# Patient Record
Sex: FEMALE | Race: WHITE | URBAN - METROPOLITAN AREA
[De-identification: names, ages, dates, MRNs, and addresses within clinical notes are randomized per-mention and may not be internally consistent; named-entity substitution may affect disease eponyms.]

---

## 2017-01-20 LAB — TSH: 3.07 MIU/ML (ref 0.35–5.5)

## 2017-01-26 ENCOUNTER — TELEPHONE (OUTPATIENT)
Dept: FAMILY MEDICINE CLINIC | Facility: CLINIC | Age: 57
End: 2017-01-26

## 2017-01-26 NOTE — TELEPHONE ENCOUNTER
Calling for lab results. Wants copy of that result and results from sept. Please fax to [phone].  Also needs refill on inhaler sent to [facility]

## 2017-01-27 DIAGNOSIS — E03.9 HYPOTHYROIDISM, UNSPECIFIED TYPE: Primary | ICD-10-CM

## 2017-01-27 RX ORDER — FEXOFENADINE HCL AND PSEUDOEPHEDRINE HCI 180; 240 MG/1; MG/1
TABLET, EXTENDED RELEASE ORAL
COMMUNITY
Start: 2013-12-06 | End: 2017-07-18

## 2017-01-27 RX ORDER — ASPIRIN 81 MG/1
81 TABLET ORAL DAILY
COMMUNITY
Start: 2010-11-01

## 2017-01-27 RX ORDER — CHOLECALCIFEROL (VITAMIN D3) 125 MCG
1 CAPSULE ORAL DAILY
COMMUNITY
Start: 2010-11-01 | End: 2017-11-03

## 2017-01-27 RX ORDER — FEXOFENADINE HCL 180 MG/1
TABLET ORAL
COMMUNITY
Start: 2012-11-09 | End: 2017-07-18

## 2017-01-27 RX ORDER — ALBUTEROL SULFATE 90 UG/1
AEROSOL, METERED RESPIRATORY (INHALATION)
COMMUNITY
Start: 2011-09-30 | End: 2017-01-28

## 2017-01-27 RX ORDER — EPINEPHRINE 0.3 MG/.3ML
INJECTION SUBCUTANEOUS AS NEEDED
COMMUNITY
Start: 2015-09-18 | End: 2017-11-03

## 2017-01-27 RX ORDER — LEVOTHYROXINE AND LIOTHYRONINE 57; 13.5 UG/1; UG/1
TABLET ORAL
COMMUNITY
Start: 2011-09-30 | End: 2017-01-28

## 2017-01-27 RX ORDER — MONTELUKAST SODIUM 10 MG/1
10 TABLET ORAL DAILY
COMMUNITY
Start: 2015-09-18 | End: 2017-11-03

## 2017-01-27 NOTE — TELEPHONE ENCOUNTER
Patient calling for 1/20 Test Results. Phoned [facility]-Results forthcoming. Patient requesting copy of above and Previous test results mailed to Her home.

## 2017-01-27 NOTE — TELEPHONE ENCOUNTER
Phone Patient letting Her know lab results from 9/23/2016 and 1/20/2017  Are being sent in the mail. Patient wanted to know TSH Level from 1/20/2017. Informed 3.07. T4Free . 08. Patient confirming that She is not to change Her Thyroid Medication.   Melvin Urbina

## 2017-01-27 NOTE — TELEPHONE ENCOUNTER
1/20/2017 Lab Draw from [facility] did not fall into [facility] or Epic. Results obtained from [facility] by Fax. Please advise. Patient also requesting copy of Labs previous/current mailed to Her home. Please advise.   Sun Ayon, 01/27/2017, 1:25 PM

## 2017-01-27 NOTE — TELEPHONE ENCOUNTER
Ok per Dr Ngozi Morales to send most recent lab/previous lab results to patient.   Penny Cooper, 01/27/2017, 3:07 PM

## 2017-01-28 RX ORDER — ALBUTEROL SULFATE 90 UG/1
2 AEROSOL, METERED RESPIRATORY (INHALATION) EVERY 6 HOURS PRN
Qty: 1 INHALER | Refills: 1 | Status: SHIPPED | OUTPATIENT
Start: 2017-01-28

## 2017-01-28 RX ORDER — LEVOTHYROXINE AND LIOTHYRONINE 76; 18 UG/1; UG/1
120 TABLET ORAL DAILY
Qty: 90 TABLET | Refills: 1 | Status: SHIPPED | OUTPATIENT
Start: 2017-01-28 | End: 2017-07-18 | Stop reason: DRUGHIGH

## 2017-01-28 NOTE — TELEPHONE ENCOUNTER
Patient informed of Synthroid Increase. Pharmacy [facility]. Patient also asking for ProAir Inhaler refill. Please advise.

## 2017-01-28 NOTE — TELEPHONE ENCOUNTER
Pt prior levels and chart reviewed. Pt continue with tsh> 3. Ok to try further increase of does of medication. Recheck level 3-4 months.

## 2017-05-02 ENCOUNTER — TELEPHONE (OUTPATIENT)
Dept: FAMILY MEDICINE CLINIC | Facility: CLINIC | Age: 57
End: 2017-05-02

## 2017-05-03 NOTE — TELEPHONE ENCOUNTER
FYI:  Pt called back, states she was informed. States her prescription /Lot number were not matching list provided.

## 2017-06-30 LAB
AMB EXT CHOL/HDL RATIO: 3.1
AMB EXT CHOLESTEROL, TOTAL: 208 MG/DL
AMB EXT CREATININE: 0.82 MG/DL
AMB EXT GFR: 81
AMB EXT GLUCOSE: 90 MG/DL
AMB EXT HDL CHOLESTEROL: 67 MG/DL
AMB EXT LDL CHOLESTEROL, DIRECT: 124 MG/DL
AMB EXT NON HDL CHOL: 141 MG/DL
AMB EXT TRIGLYCERIDES: 83 MG/DL
AMB EXT TSH: 0.07 MIU/ML

## 2017-07-10 ENCOUNTER — TELEPHONE (OUTPATIENT)
Dept: FAMILY MEDICINE CLINIC | Facility: CLINIC | Age: 57
End: 2017-07-10

## 2017-07-10 NOTE — TELEPHONE ENCOUNTER
Allie Hidden for pt to have last 2 lab results  . Please add labs to chart file    tsh - abnormal- appt advised    Physical -last appt 9/23/16  No future appointments.

## 2017-07-10 NOTE — TELEPHONE ENCOUNTER
Patient informed of below. Appt given Tues 7/18 4:30 with Dr Filiberto Hernandez to discuss results.   Barron Mtz, 07/10/17, 1:16 PM

## 2017-07-10 NOTE — TELEPHONE ENCOUNTER
Recent test results faxed to Dr Cy Anaya. Also requesting past 2 TSH results to be faxed to Her/done.   Siri Lemos, 07/10/17, 9:37 AM

## 2017-07-10 NOTE — TELEPHONE ENCOUNTER
M/L Jessica Beltran, 07/10/17, 1:03 PM  Unable to fax results to Patient's work number. Can either  or mail.   Jessica Beltran, 07/10/17, 1:03 PM

## 2017-07-18 ENCOUNTER — OFFICE VISIT (OUTPATIENT)
Dept: FAMILY MEDICINE CLINIC | Facility: CLINIC | Age: 57
End: 2017-07-18

## 2017-07-18 VITALS
HEIGHT: 68 IN | WEIGHT: 214 LBS | BODY MASS INDEX: 32.43 KG/M2 | HEART RATE: 84 BPM | DIASTOLIC BLOOD PRESSURE: 86 MMHG | SYSTOLIC BLOOD PRESSURE: 148 MMHG | TEMPERATURE: 99 F | RESPIRATION RATE: 20 BRPM

## 2017-07-18 DIAGNOSIS — E06.3 HYPOTHYROIDISM DUE TO HASHIMOTO'S THYROIDITIS: Primary | ICD-10-CM

## 2017-07-18 DIAGNOSIS — E03.8 HYPOTHYROIDISM DUE TO HASHIMOTO'S THYROIDITIS: Primary | ICD-10-CM

## 2017-07-18 PROCEDURE — 99213 OFFICE O/P EST LOW 20 MIN: CPT | Performed by: FAMILY MEDICINE

## 2017-07-18 RX ORDER — LEVOTHYROXINE AND LIOTHYRONINE 57; 13.5 UG/1; UG/1
45 TABLET ORAL 2 TIMES DAILY
COMMUNITY
End: 2017-07-18 | Stop reason: CLARIF

## 2017-07-18 RX ORDER — LEVOTHYROXINE AND LIOTHYRONINE 57; 13.5 UG/1; UG/1
45 TABLET ORAL 2 TIMES DAILY
Qty: 90 TABLET | Refills: 2 | Status: SHIPPED | OUTPATIENT
Start: 2017-07-18 | End: 2017-07-18

## 2017-07-18 RX ORDER — LEVOTHYROXINE AND LIOTHYRONINE 57; 13.5 UG/1; UG/1
90 TABLET ORAL DAILY
Qty: 90 TABLET | Refills: 2 | Status: SHIPPED | OUTPATIENT
Start: 2017-07-18 | End: 2017-11-03 | Stop reason: DRUGHIGH

## 2017-07-18 NOTE — PATIENT INSTRUCTIONS
rec lower dose of thyroid to 90 mg a day    Labs 6 month--tsh and free t4    Pt to call if any concerns before

## 2017-07-18 NOTE — PROGRESS NOTES
Vasu Urias is a 56year old female. HPI:   Patient presents for recheck of her thyroid. Pt has been taking medications as instructed, no medication side effects.      Pt with drop of tsh-- pt feels best when level of tsh < 3. tsh   Pt with some heat denies chest pain  GI: denies abdominal pain and denies heartburn  NEURO: denies headaches    EXAM:   /86   Pulse 84   Temp 98.9 °F (37.2 °C) (Tympanic)   Resp 20   Ht 68\"   Wt 214 lb   LMP 07/11/2017   BMI 32.54 kg/m²   GENERAL: well developed, wel

## 2017-10-16 ENCOUNTER — TELEPHONE (OUTPATIENT)
Dept: FAMILY MEDICINE CLINIC | Facility: CLINIC | Age: 57
End: 2017-10-16

## 2017-10-16 NOTE — TELEPHONE ENCOUNTER
fax with lab results should be coming in today via fax from [facility]- wants a phone call before any meds are sent to a pharmacy so she can update brand of medication she wants

## 2017-10-16 NOTE — TELEPHONE ENCOUNTER
Pt informed, agrees to proceed with RX- pt has upcoming appt 11/3-       Future Appointments  Date Time Provider Kristen Lyndsey   11/3/2017 3:00 PM Soledad Long MD [facility]

## 2017-10-16 NOTE — TELEPHONE ENCOUNTER
Patient informed of the below results.   States she is needing a RF of her Thyroid medication but is wondering about changing to Elizabethann Labella Thyroid at this time due to cost.  States the medication she is on now costs over $100 and Elizabethann Labella Thyroid is less than rivas

## 2017-11-03 ENCOUNTER — OFFICE VISIT (OUTPATIENT)
Dept: FAMILY MEDICINE CLINIC | Facility: CLINIC | Age: 57
End: 2017-11-03

## 2017-11-03 VITALS
TEMPERATURE: 99 F | BODY MASS INDEX: 32.45 KG/M2 | RESPIRATION RATE: 16 BRPM | HEART RATE: 84 BPM | SYSTOLIC BLOOD PRESSURE: 152 MMHG | DIASTOLIC BLOOD PRESSURE: 82 MMHG | WEIGHT: 214.13 LBS | HEIGHT: 68 IN

## 2017-11-03 DIAGNOSIS — E03.8 HYPOTHYROIDISM DUE TO HASHIMOTO'S THYROIDITIS: ICD-10-CM

## 2017-11-03 DIAGNOSIS — E06.3 HYPOTHYROIDISM DUE TO HASHIMOTO'S THYROIDITIS: ICD-10-CM

## 2017-11-03 DIAGNOSIS — N60.19 FIBROCYSTIC BREAST DISEASE (FCBD), UNSPECIFIED LATERALITY: ICD-10-CM

## 2017-11-03 DIAGNOSIS — Z00.00 ANNUAL PHYSICAL EXAM: Primary | ICD-10-CM

## 2017-11-03 DIAGNOSIS — J30.1 CHRONIC SEASONAL ALLERGIC RHINITIS DUE TO POLLEN: ICD-10-CM

## 2017-11-03 PROBLEM — Z91.030 H/O BEE STING ALLERGY: Status: ACTIVE | Noted: 2017-11-03

## 2017-11-03 PROCEDURE — 99396 PREV VISIT EST AGE 40-64: CPT | Performed by: FAMILY MEDICINE

## 2017-11-03 RX ORDER — FUROSEMIDE 20 MG/1
20 TABLET ORAL DAILY PRN
Qty: 30 TABLET | Refills: 1 | Status: SHIPPED | OUTPATIENT
Start: 2017-11-03 | End: 2017-11-03

## 2017-11-03 RX ORDER — FUROSEMIDE 20 MG/1
TABLET ORAL
Qty: 90 TABLET | Refills: 0 | Status: SHIPPED | OUTPATIENT
Start: 2017-11-03 | End: 2020-11-25 | Stop reason: ALTCHOICE

## 2017-11-03 RX ORDER — FEXOFENADINE HCL AND PSEUDOEPHEDRINE HCI 180; 240 MG/1; MG/1
1 TABLET, EXTENDED RELEASE ORAL DAILY
Qty: 90 TABLET | Refills: 1 | Status: SHIPPED | OUTPATIENT
Start: 2017-11-03 | End: 2017-11-06

## 2017-11-03 RX ORDER — EPINEPHRINE 0.3 MG/.3ML
0.3 INJECTION SUBCUTANEOUS AS NEEDED
Qty: 1 EACH | Refills: 1 | Status: SHIPPED | OUTPATIENT
Start: 2017-11-03 | End: 2020-05-24

## 2017-11-03 RX ORDER — MONTELUKAST SODIUM 10 MG/1
10 TABLET ORAL DAILY
Qty: 90 TABLET | Refills: 1 | Status: SHIPPED | OUTPATIENT
Start: 2017-11-03 | End: 2018-11-16

## 2017-11-03 RX ORDER — ALBUTEROL SULFATE 90 UG/1
2 AEROSOL, METERED RESPIRATORY (INHALATION) EVERY 4 HOURS PRN
Qty: 1 INHALER | Refills: 6 | Status: SHIPPED | OUTPATIENT
Start: 2017-11-03 | End: 2018-11-16

## 2017-11-03 NOTE — PROGRESS NOTES
CC: Annual Physical Exam    HPI:   Malini Aponte is a 56year old female who presents for a complete physical exam.  Patient generally doing well.           Pt needing refill of allergy meds and epi pen. meds working well    Pt hashimotos thyroiditis rev education:                 Number of children:               Social History Main Topics    Smoking status: Never Smoker                                                                Smokeless tobacco: Never Used                        Alcohol use:  Yes 98.9 °F (37.2 °C) (Temporal)   Resp 16   Ht 68\"   Wt 214 lb 2 oz   LMP 10/29/2017   BMI 32.56 kg/m²  Estimated body mass index is 32.56 kg/m² as calculated from the following:    Height as of this encounter: 68\".     Weight as of this encounter: 214 lb 2 TSH and Free T4 [E]    Annual physical exam  (primary encounter diagnosis)  Hypothyroidism due to hashimoto's thyroiditis  Chronic seasonal allergic rhinitis due to pollen  Fibrocystic breast disease (fcbd), unspecified laterality    Patient Instructions

## 2017-11-03 NOTE — TELEPHONE ENCOUNTER
Future appt:    Last Appointment:  11/3/2017    Cholesterol, Total (mg/dL)   Date Value   06/30/2017 208   ----------  HDL Cholesterol (mg/dL)   Date Value   06/30/2017 67   ----------  Triglycerides (mg/dL)   Date Value   06/30/2017 83   ----------  No re

## 2017-11-06 RX ORDER — FEXOFENADINE HCL AND PSEUDOEPHEDRINE HCI 180; 240 MG/1; MG/1
1 TABLET, EXTENDED RELEASE ORAL DAILY
Qty: 90 TABLET | Refills: 1 | Status: SHIPPED | OUTPATIENT
Start: 2017-11-06 | End: 2018-11-01

## 2017-12-06 ENCOUNTER — TELEPHONE (OUTPATIENT)
Dept: FAMILY MEDICINE CLINIC | Facility: CLINIC | Age: 57
End: 2017-12-06

## 2017-12-06 DIAGNOSIS — E03.9 HYPOTHYROIDISM, UNSPECIFIED TYPE: Primary | ICD-10-CM

## 2017-12-06 RX ORDER — LEVOTHYROXINE AND LIOTHYRONINE 57; 13.5 UG/1; UG/1
90 TABLET ORAL DAILY
Qty: 90 TABLET | Refills: 0 | Status: SHIPPED | OUTPATIENT
Start: 2017-12-06 | End: 2017-12-08

## 2017-12-06 NOTE — TELEPHONE ENCOUNTER
Pt currently on Nature Throid 81.25mg dose (noted under meds all). Pt states she was to repeat thyroid in one month. Faxed result for MD review.

## 2017-12-06 NOTE — TELEPHONE ENCOUNTER
Patient states she faxed lab results to Dr Randa Padilla. Wanting to know if Dr Randa Padilla received them and what about meds.

## 2017-12-06 NOTE — TELEPHONE ENCOUNTER
Please get an office lab report directly from [address] [place] [address] for chart.     Reviewed levels-- they are in desired ranges but Free T4 is low end of normal and TSH is high end- pt has option-- if feeling well, to continue same dose and recheck 3 months, otherwise coul

## 2017-12-06 NOTE — TELEPHONE ENCOUNTER
Report requested using [employer],    Pt states that she would like to increase dose,-  Uses [facility], [place]. Only change is that pt is having more constipation.

## 2017-12-08 ENCOUNTER — TELEPHONE (OUTPATIENT)
Dept: FAMILY MEDICINE CLINIC | Facility: CLINIC | Age: 57
End: 2017-12-08

## 2017-12-08 NOTE — TELEPHONE ENCOUNTER
Pt had dose of thyroid medication increased on 12/6, however, pt states she was prescribed wrong thyroid medication, states  She is on nature thryoid, states that pharmacist told her that the next increase in dose would be 97.5mg,   Not 90 mg?   Also pt sta

## 2017-12-08 NOTE — TELEPHONE ENCOUNTER
Pharmacy states they are not always able to get  [facility] thyroid medication from [facility], so pt was dispensed [place] thyroid.   Pt states they has been trying to get off [place] due to cost, did better with [facility] calvin, Told pt now sure what we can do, if pharmacy

## 2017-12-08 NOTE — TELEPHONE ENCOUNTER
Pt spoke with Alyse, states she was told that she would be able to get the [facility] thyroid 97.5mg dose at this time. Pt is asking for 90 day supply to get sent to [facility] in [place] pt will be due for repeat labs in 3 months.

## 2018-02-23 ENCOUNTER — TELEPHONE (OUTPATIENT)
Dept: FAMILY MEDICINE CLINIC | Facility: CLINIC | Age: 58
End: 2018-02-23

## 2018-02-23 NOTE — TELEPHONE ENCOUNTER
Regarding: Prescription Question  Contact: [phone]  ----- Message from Jacques Morales MD sent at 2/23/2018  8:33 AM CST -----       ----- Message from Taiwo Basurto to Desmond Serrano MD sent at 2/22/2018  5:22 PM -----   I would like to ge

## 2018-02-23 NOTE — TELEPHONE ENCOUNTER
Pt informed that RX for [place] was refilled today.   Pt stated that she would call back when [facility] thyroid med was available at pharmacy, stated that [place] is becoming too expensive

## 2018-05-10 RX ORDER — LEVOTHYROXINE AND LIOTHYRONINE 57; 13.5 UG/1; UG/1
90 TABLET ORAL DAILY
Qty: 30 TABLET | Refills: 1 | Status: SHIPPED
Start: 2018-05-10 | End: 2018-07-10

## 2018-05-10 NOTE — TELEPHONE ENCOUNTER
From: Nickie Ralph  Sent: 5/10/2018 12:13 PM CDT  Subject: Medication Renewal Request    Safia Daniel would like a refill of the following medications:     thyroid (ARMOUR THYROID) 90 MG Oral Tab Merary Whitt MD]   Patient Comment: I will n

## 2018-05-10 NOTE — TELEPHONE ENCOUNTER
Future appt:    Last Appointment:  Visit date not found    Cholesterol, Total (mg/dL)   Date Value   06/30/2017 208   ----------  HDL Cholesterol (mg/dL)   Date Value   06/30/2017 67   ----------  Triglycerides (mg/dL)   Date Value   06/30/2017 83   ------

## 2018-07-10 RX ORDER — LEVOTHYROXINE AND LIOTHYRONINE 57; 13.5 UG/1; UG/1
90 TABLET ORAL DAILY
Qty: 30 TABLET | Refills: 2 | Status: SHIPPED
Start: 2018-07-10 | End: 2018-10-13

## 2018-07-10 NOTE — TELEPHONE ENCOUNTER
Future appt:    Last Appointment:   11/3/17  Last lab (TSH)  12/1/17    [facility] thyroid refill given on 5/10/18 for #30, 1 refill.     Cholesterol, Total (mg/dL)   Date Value   06/30/2017 208   ----------  HDL Cholesterol (mg/dL)   Date Value   06/30/2017 67

## 2018-07-10 NOTE — TELEPHONE ENCOUNTER
From: Jessica Ralph  Sent: 7/10/2018 12:37 PM CDT  Subject: Medication Renewal Request    Emery Gatica would like a refill of the following medications:     thyroid (ARMOUR THYROID) 90 MG Oral Tab Nilda Alvarez MD]   Patient Comment: If we co

## 2018-07-14 ENCOUNTER — TELEPHONE (OUTPATIENT)
Dept: FAMILY MEDICINE CLINIC | Facility: CLINIC | Age: 58
End: 2018-07-14

## 2018-07-14 NOTE — TELEPHONE ENCOUNTER
----- Message from Judi Joe sent at 7/13/2018  7:16 PM CDT -----  Regarding: Prescription Question  Contact: [phone]  I picked up my prescription yesterday and pharmacist stated it was levothyroxine.  He said there was not an option for substi

## 2018-07-14 NOTE — TELEPHONE ENCOUNTER
Pt informed that [place] thyroid 90 mg dose was refilled back on 7/10 which is the same refill as pt received back on 5/10/18 and and 2/23/18. Pt urged to contact pharmacy.

## 2018-10-15 NOTE — TELEPHONE ENCOUNTER
Future appt:     Your appointments     Date & Time Appointment Department [facility])    Nov 16, 2018  9:00 AM CST Physical - Established Patient with Shahab Thornton MD [facility] [place])        Ed

## 2018-10-15 NOTE — TELEPHONE ENCOUNTER
Called pharmacy to verify, current request is for [place] (genceric version)    Last refilled 7/10 for #30 with 2 refills- pt has upcoming appt in November    Future Appointments   Date Time Provider Kristen Solorio   11/16/2018  9:00 AM Shazia Perez

## 2018-11-16 ENCOUNTER — OFFICE VISIT (OUTPATIENT)
Dept: FAMILY MEDICINE CLINIC | Facility: CLINIC | Age: 58
End: 2018-11-16
Payer: COMMERCIAL

## 2018-11-16 VITALS
BODY MASS INDEX: 33.31 KG/M2 | HEART RATE: 100 BPM | RESPIRATION RATE: 16 BRPM | WEIGHT: 219.81 LBS | DIASTOLIC BLOOD PRESSURE: 84 MMHG | OXYGEN SATURATION: 96 % | SYSTOLIC BLOOD PRESSURE: 158 MMHG | TEMPERATURE: 98 F | HEIGHT: 68 IN

## 2018-11-16 DIAGNOSIS — N60.12 FIBROCYSTIC BREAST CHANGES OF BOTH BREASTS: ICD-10-CM

## 2018-11-16 DIAGNOSIS — N60.11 FIBROCYSTIC BREAST CHANGES OF BOTH BREASTS: ICD-10-CM

## 2018-11-16 DIAGNOSIS — Z11.59 ENCOUNTER FOR HEPATITIS C SCREENING TEST FOR LOW RISK PATIENT: ICD-10-CM

## 2018-11-16 DIAGNOSIS — Z00.00 ANNUAL PHYSICAL EXAM: Primary | ICD-10-CM

## 2018-11-16 DIAGNOSIS — J30.1 SEASONAL ALLERGIC RHINITIS DUE TO POLLEN: ICD-10-CM

## 2018-11-16 DIAGNOSIS — E06.3 HYPOTHYROIDISM DUE TO HASHIMOTO'S THYROIDITIS: ICD-10-CM

## 2018-11-16 DIAGNOSIS — E03.8 HYPOTHYROIDISM DUE TO HASHIMOTO'S THYROIDITIS: ICD-10-CM

## 2018-11-16 DIAGNOSIS — R03.0 ELEVATED BLOOD-PRESSURE READING WITHOUT DIAGNOSIS OF HYPERTENSION: ICD-10-CM

## 2018-11-16 PROCEDURE — 80050 GENERAL HEALTH PANEL: CPT | Performed by: FAMILY MEDICINE

## 2018-11-16 PROCEDURE — 81003 URINALYSIS AUTO W/O SCOPE: CPT | Performed by: FAMILY MEDICINE

## 2018-11-16 PROCEDURE — 99396 PREV VISIT EST AGE 40-64: CPT | Performed by: FAMILY MEDICINE

## 2018-11-16 PROCEDURE — 80061 LIPID PANEL: CPT | Performed by: FAMILY MEDICINE

## 2018-11-16 RX ORDER — MONTELUKAST SODIUM 10 MG/1
10 TABLET ORAL AS NEEDED
COMMUNITY
End: 2019-11-22

## 2018-11-16 RX ORDER — MULTIVITAMIN
1 TABLET ORAL DAILY
COMMUNITY

## 2018-11-16 NOTE — PROGRESS NOTES
CC: Annual Physical Exam    HPI:   Lauryn Samano is a 66year old female who presents for a complete physical exam. . HOme /78, at work 116/24  No chest pain or son    Patient generally doing well. She is exercising 5 times a week.   Ned MCG/ACT Inhalation Aero Soln Inhale 2 puffs into the lungs every 6 (six) hours as needed for Wheezing (1-2 Puffs Q4-6hrs PRN). Disp: 1 Inhaler Rfl: 1   aspirin 81 MG Oral Tab EC Take 81 mg by mouth daily.    Disp:  Rfl:         Bee Venom               ANAPH at rest.  RESPIRATORY:  Denies shortness of breath, wheezing, cough or sputum. GASTROINTESTINAL:  Denies abdominal pain, nausea, vomiting, constipation, diarrhea, or blood in stool.   MUSCULOSKELETAL:  Denies weakness, muscle aches, back pain, joint pain, breast palpable cystic nodularity between 3 and 6:00. Left breast no palpable abnormalities. CHEST: No tenderness.   ABDOMEN:  Soft, nondistended, nontender,no masses, no hepatosplenomegaly, bowel sounds normal.  BACK: No tenderness, no spasm, SLR test ne Profile [E]      Annual physical exam  (primary encounter diagnosis)  Hypothyroidism due to hashimoto's thyroiditis  Seasonal allergic rhinitis due to pollen  Fibrocystic breast changes of both breasts  Elevated blood-pressure reading without diagnosis of plan.        Meds & Refills for this Visit:  Requested Prescriptions      No prescriptions requested or ordered in this encounter       Imaging & Consults:  [facility] (CPT=77066)    The patient is asked to return pending lab results.

## 2018-11-16 NOTE — PATIENT INSTRUCTIONS
rec diagnostic mammogram    rec colon screening  Info re cologuard and home stool cards    Fasting labs today    rec hep B and hep C      Lifestyle Modification:   (AVG.  SBP = Average Systolic Blood Pressure)  Lifestyle Modification Recommendations  Modifi

## 2018-11-17 ENCOUNTER — PATIENT MESSAGE (OUTPATIENT)
Dept: FAMILY MEDICINE CLINIC | Facility: CLINIC | Age: 58
End: 2018-11-17

## 2018-11-17 ENCOUNTER — TELEPHONE (OUTPATIENT)
Dept: FAMILY MEDICINE CLINIC | Facility: CLINIC | Age: 58
End: 2018-11-17

## 2018-11-17 NOTE — TELEPHONE ENCOUNTER
Lab results reviewed  Chem panel- slightly low K-- encourage potassium rich foods,( bananas, potatoe, brocholli)   rest of chemistry normal  Thyroid, cbc, ua-normal  Lipid levels slightly elevated patient does have a good level of HDL which is protective p

## 2018-11-17 NOTE — TELEPHONE ENCOUNTER
----- Message from Robin Mckenzie MD sent at 11/16/2018  6:56 PM CST -----  Laboratory results reviewed. Hepatitis C screen is negative. Hepatitis B profile shows immunity and protection from hepatitis B.   This is consistent immunized status  Patient

## 2018-11-19 NOTE — TELEPHONE ENCOUNTER
From: Janay Kaur  To: Celso Love MD  Sent: 11/17/2018 3:42 PM CST  Subject: Non-Urgent Medical Question    please update my tdap immunization to 11-17-18.  Thanks

## 2018-12-07 ENCOUNTER — LAB ENCOUNTER (OUTPATIENT)
Dept: LAB | Facility: HOSPITAL | Age: 58
End: 2018-12-07
Attending: FAMILY MEDICINE
Payer: COMMERCIAL

## 2018-12-07 DIAGNOSIS — Z00.00 ANNUAL PHYSICAL EXAM: Primary | ICD-10-CM

## 2018-12-07 PROCEDURE — 82274 ASSAY TEST FOR BLOOD FECAL: CPT

## 2019-03-19 ENCOUNTER — TELEPHONE (OUTPATIENT)
Dept: FAMILY MEDICINE CLINIC | Facility: CLINIC | Age: 59
End: 2019-03-19

## 2019-03-19 LAB — TSH: 3.89 UIU/ML

## 2019-03-19 NOTE — TELEPHONE ENCOUNTER
Regarding: Prescription Question  Contact: [phone]  ----- Message from Moon Rousseau MD sent at 3/19/2019  8:05 AM CDT -----       ----- Message from Johnathan Vo to Markus Farmer MD sent at 3/18/2019  7:48 PM -----   I just had my blo

## 2019-03-19 NOTE — TELEPHONE ENCOUNTER
Pt informed appt needed to discuss. Appt given. Pt will have her lab report faxed.     Future Appointments   Date Time Provider Kristen Lyndsey   4/3/2019  6:30 PM Harry Camargo MD [facility]

## 2019-04-03 ENCOUNTER — OFFICE VISIT (OUTPATIENT)
Dept: FAMILY MEDICINE CLINIC | Facility: CLINIC | Age: 59
End: 2019-04-03
Payer: COMMERCIAL

## 2019-04-03 VITALS
RESPIRATION RATE: 14 BRPM | TEMPERATURE: 98 F | BODY MASS INDEX: 33.7 KG/M2 | OXYGEN SATURATION: 98 % | HEIGHT: 68 IN | SYSTOLIC BLOOD PRESSURE: 148 MMHG | WEIGHT: 222.38 LBS | HEART RATE: 80 BPM | DIASTOLIC BLOOD PRESSURE: 82 MMHG

## 2019-04-03 DIAGNOSIS — E03.8 HYPOTHYROIDISM DUE TO HASHIMOTO'S THYROIDITIS: Primary | ICD-10-CM

## 2019-04-03 DIAGNOSIS — E06.3 HYPOTHYROIDISM DUE TO HASHIMOTO'S THYROIDITIS: Primary | ICD-10-CM

## 2019-04-03 DIAGNOSIS — R03.0 ELEVATED BLOOD-PRESSURE READING WITHOUT DIAGNOSIS OF HYPERTENSION: ICD-10-CM

## 2019-04-03 DIAGNOSIS — N95.1 MENOPAUSAL HOT FLUSHES: ICD-10-CM

## 2019-04-03 PROCEDURE — 99214 OFFICE O/P EST MOD 30 MIN: CPT | Performed by: FAMILY MEDICINE

## 2019-04-03 RX ORDER — LEVOTHYROXINE AND LIOTHYRONINE 38; 9 UG/1; UG/1
60 TABLET ORAL DAILY
Qty: 90 TABLET | Refills: 1 | Status: SHIPPED | OUTPATIENT
Start: 2019-04-03 | End: 2019-06-28

## 2019-04-03 NOTE — PROGRESS NOTES
[address]  PROGRESS NOTE  Chief Complaint:   Patient presents with:   Follow - Up: Thyroid  Medication Follow-Up      HPI:   This is a 64year old female coming in for thyroid f.u    tsh -- pt lowered  meds on own feeling better  Gerlaw- reviewed. No pertinent family history. Allergies:    Bee Venom               ANAPHYLAXIS    Comment:Bee stings  Current Meds:    Current Outpatient Medications:  thyroid 60 MG Oral Tab Take 1 tablet (60 mg total) by mouth daily.  Disp: 90 tablet Rfl: 1   T tingling in the extremities  HEMATOLOGIC:  Denies anemia, bleeding or bruising. LYMPHATICS:  Denies enlarged nodes  PSYCHIATRIC:  Denies depression or anxiety. ENDOCRINOLOGIC:  Denies excessive sweating, cold or heat intolerance, polyuria or polydipsia. Instructions   Pt with borderline BP-- monitor -- report back after a month on higher Thyroid dose  Call if headache or dizziness    Recheck tsh- in 3 months    [id number] for soy suppliments, jorden for hot flashes                Meds & Refills for this Visi

## 2019-04-04 NOTE — PATIENT INSTRUCTIONS
Pt with borderline BP-- monitor -- report back after a month on higher Thyroid dose  Call if headache or dizziness    Recheck tsh- in 3 months    [id number] for soy suppliments, jorden for hot flashes

## 2019-06-28 ENCOUNTER — TELEPHONE (OUTPATIENT)
Dept: FAMILY MEDICINE CLINIC | Facility: CLINIC | Age: 59
End: 2019-06-28

## 2019-06-28 RX ORDER — LEVOTHYROXINE AND LIOTHYRONINE 38; 9 UG/1; UG/1
60 TABLET ORAL DAILY
Qty: 90 TABLET | Refills: 1 | Status: SHIPPED | OUTPATIENT
Start: 2019-06-28 | End: 2019-09-27

## 2019-06-28 NOTE — TELEPHONE ENCOUNTER
Pt informed. Appt given. Pt will come fasting.     Future Appointments   Date Time Provider Kristen Solorio   11/22/2019  8:00 AM Johnathan Kendall MD [facility]

## 2019-06-28 NOTE — TELEPHONE ENCOUNTER
Pt had normal TSH drawn on 6/21/19. Entered into flow sheet. TSH:  2.29. Note faxed by pt. \"attached are the blood work results from 6/21/19. This is the 60 mg thyroid np. Much better in the 2.00 range. No headaches and sleeping.   BP has been ve

## 2019-09-27 ENCOUNTER — TELEPHONE (OUTPATIENT)
Dept: FAMILY MEDICINE CLINIC | Facility: CLINIC | Age: 59
End: 2019-09-27

## 2019-09-27 RX ORDER — THYROID, PORCINE 60 MG/1
65 TABLET ORAL DAILY
Qty: 90 TABLET | Refills: 0 | Status: SHIPPED | OUTPATIENT
Start: 2019-09-27 | End: 2019-10-27

## 2019-09-27 RX ORDER — THYROID, PORCINE 60 MG/1
65 TABLET ORAL DAILY
Qty: 90 TABLET | Refills: 0 | Status: SHIPPED | OUTPATIENT
Start: 2019-09-27 | End: 2019-09-27

## 2019-09-27 NOTE — TELEPHONE ENCOUNTER
Olga Zarate for return to [facility] Thyroid if it is again available. F.u in NOvember as planned.     Future Appointments   Date Time Provider Kristen Solorio   11/22/2019  9:00 AM Cathy Hernández MD [facility]

## 2019-09-27 NOTE — TELEPHONE ENCOUNTER
Pt called. States that she is currently on [place]- is paying >$50 for 90 day supply.   Pt spoke with [facility], was told that have the [facility] thyroid back in stock- was told taht 65 mg would be equivant to the 60mg of [place] she is currently

## 2019-09-27 NOTE — TELEPHONE ENCOUNTER
gabriel in [place] has been trying to send refill request for her thyroid medication - please send - is supposed to be [facility] thyroid 65 mg- needs this one specifically otherwise pharmacy will fill synthetic or other brands

## 2019-09-27 NOTE — TELEPHONE ENCOUNTER
Prescription change to [facility] thyroid 65 mg.     for future I recommend that she sticks with 1 brand of prescription otherwise it is hard to manage thyroid levels.

## 2019-11-22 ENCOUNTER — OFFICE VISIT (OUTPATIENT)
Dept: FAMILY MEDICINE CLINIC | Facility: CLINIC | Age: 59
End: 2019-11-22
Payer: COMMERCIAL

## 2019-11-22 VITALS
TEMPERATURE: 98 F | OXYGEN SATURATION: 98 % | RESPIRATION RATE: 16 BRPM | SYSTOLIC BLOOD PRESSURE: 120 MMHG | WEIGHT: 223.19 LBS | HEART RATE: 86 BPM | DIASTOLIC BLOOD PRESSURE: 68 MMHG | BODY MASS INDEX: 33.83 KG/M2 | HEIGHT: 68 IN

## 2019-11-22 DIAGNOSIS — E03.8 HYPOTHYROIDISM DUE TO HASHIMOTO'S THYROIDITIS: ICD-10-CM

## 2019-11-22 DIAGNOSIS — E06.3 HYPOTHYROIDISM DUE TO HASHIMOTO'S THYROIDITIS: ICD-10-CM

## 2019-11-22 DIAGNOSIS — N63.20 LEFT BREAST MASS: ICD-10-CM

## 2019-11-22 DIAGNOSIS — Z00.00 ANNUAL PHYSICAL EXAM: Primary | ICD-10-CM

## 2019-11-22 DIAGNOSIS — J30.1 SEASONAL ALLERGIC RHINITIS DUE TO POLLEN: ICD-10-CM

## 2019-11-22 PROBLEM — R03.0 ELEVATED BLOOD-PRESSURE READING WITHOUT DIAGNOSIS OF HYPERTENSION: Status: RESOLVED | Noted: 2018-11-16 | Resolved: 2019-11-22

## 2019-11-22 PROCEDURE — 99396 PREV VISIT EST AGE 40-64: CPT | Performed by: FAMILY MEDICINE

## 2019-11-22 RX ORDER — THYROID, PORCINE 60 MG/1
1 TABLET ORAL DAILY
Qty: 30 TABLET | Refills: 0 | COMMUNITY
Start: 2019-11-22 | End: 2019-12-04

## 2019-11-22 NOTE — PATIENT INSTRUCTIONS
rec diagnostic mammogram    rec fasting labs    rec colon screening-- cologuard info given    Continue diet and exercise

## 2019-11-22 NOTE — PROGRESS NOTES
CC: Annual Physical Exam    HPI:   Ronald Dotson is a 64year old female who presents for a complete physical exam.  Patient complains of frustration with management of her thyroid.   Patient feels that she is finally settled on her correct medication sh ANAPHYLAXIS    Comment:Bee stings   Past Medical History:   Diagnosis Date   • Fibrocystic breast changes of both breasts 9/30/2011      History reviewed. No pertinent surgical history. History reviewed. No pertinent family history.    Social Hi sneezing, hives, eczema or rhinitis.     EXAM:   /68 (BP Location: Left arm, Patient Position: Sitting, Cuff Size: large)   Pulse 86   Temp 98.2 °F (36.8 °C) (Temporal)   Resp 16   Ht 68\"   Wt 223 lb 3.2 oz (101.2 kg)   LMP 08/31/2019   SpO2 98%   BM physical exam.     1. Annual physical exam    - COMP METABOLIC PANEL (14)  - CBC WITH DIFFERENTIAL WITH PLATELET  - LIPID PANEL  - TSH+FREE T4; Future    2. Hypothyroidism due to Hashimoto's thyroiditis    - TSH+FREE T4; Future    3.  Seasonal allergic rhin

## 2019-11-23 ENCOUNTER — TELEPHONE (OUTPATIENT)
Dept: FAMILY MEDICINE CLINIC | Facility: CLINIC | Age: 59
End: 2019-11-23

## 2019-11-23 ENCOUNTER — PATIENT MESSAGE (OUTPATIENT)
Dept: FAMILY MEDICINE CLINIC | Facility: CLINIC | Age: 59
End: 2019-11-23

## 2019-11-23 NOTE — TELEPHONE ENCOUNTER
----- Message from Jaiden Pedroza MD sent at 11/23/2019  7:47 AM CST -----  Laboratory results reviewed. Patient with normal chemistry profile. CBC normal.  Lipid profile reviewed elevated total cholesterol of 213 and LDL cholesterol of 132.   Triglyc

## 2019-11-25 NOTE — TELEPHONE ENCOUNTER
From: Elian Mean  To: Flakito Humphries MD  Sent: 11/23/2019 9:08 PM CST  Subject: Test Results Question    Just checking on my blood work results and I did not see my thyroid results.  Didn't know if I missed seeing them or if they are coming a diane

## 2019-11-25 NOTE — TELEPHONE ENCOUNTER
Contacted [facility] regarding TSH and Free T4 as it doesn't look like this was done at time of lab draw. Labs will be added.     Footnote message sent to patient with current lab results as notation by Dr. Luis Mariano and also letting her know that the Thyroid testin

## 2019-12-04 ENCOUNTER — TELEPHONE (OUTPATIENT)
Dept: FAMILY MEDICINE CLINIC | Facility: CLINIC | Age: 59
End: 2019-12-04

## 2019-12-04 RX ORDER — THYROID, PORCINE 60 MG/1
1 TABLET ORAL DAILY
Qty: 90 TABLET | Refills: 1 | Status: SHIPPED | OUTPATIENT
Start: 2019-12-04 | End: 2020-06-20

## 2019-12-04 NOTE — TELEPHONE ENCOUNTER
Letter from pt and letter from [employer]. Pt requesting to have prior authorization completed so that she can continue with [facility] or [facility]. Pt has tried Levothyroxine in the past without good results.     Called [facility]-  Representative from [facility]

## 2019-12-04 NOTE — TELEPHONE ENCOUNTER
Pt informed. Pt will call back with reminder in Jan.    Pt will need med refill- asking for 90 day supply to [facility].

## 2020-02-19 ENCOUNTER — TELEPHONE (OUTPATIENT)
Dept: FAMILY MEDICINE CLINIC | Facility: CLINIC | Age: 60
End: 2020-02-19

## 2020-02-21 NOTE — TELEPHONE ENCOUNTER
Received Prior Authorization approval for [facility] Thyroid from [employer]. Approved through 2/19/2021. Pt was informed.

## 2020-05-26 RX ORDER — EPINEPHRINE 0.3 MG/.3ML
0.3 INJECTION SUBCUTANEOUS AS NEEDED
Qty: 1 EACH | Refills: 1 | Status: SHIPPED | OUTPATIENT
Start: 2020-05-26 | End: 2021-05-31

## 2020-05-26 NOTE — TELEPHONE ENCOUNTER
Future appt:    Last Appointment with provider:   Visit date not found  Last appointment at [facility]:  Visit date not found  Last px:  11/22/19    Pt has hx: bee sting allergy    Epipen last refilled on 11/3/17      CHOLESTEROL, TOTAL (mg/dL)   Date Va

## 2020-06-19 NOTE — TELEPHONE ENCOUNTER
Future appt:    Last Appointment with provider:   Visit date not found  Last appointment at [facility]:  Visit date not found  Last px:  11/22/19    [facility] Thyroid refilled on 12/4/19 for #90 with 1 refill    CHOLESTEROL, TOTAL (mg/dL)   Date Value   11/22/2

## 2020-06-20 RX ORDER — THYROID,PORK 65 MG
TABLET ORAL
Qty: 90 TABLET | Refills: 1 | Status: SHIPPED | OUTPATIENT
Start: 2020-06-20 | End: 2020-12-28

## 2020-09-21 ENCOUNTER — PATIENT MESSAGE (OUTPATIENT)
Dept: FAMILY MEDICINE CLINIC | Facility: CLINIC | Age: 60
End: 2020-09-21

## 2020-09-21 DIAGNOSIS — Z00.00 ANNUAL PHYSICAL EXAM: ICD-10-CM

## 2020-09-21 DIAGNOSIS — E03.8 HYPOTHYROIDISM DUE TO HASHIMOTO'S THYROIDITIS: Primary | ICD-10-CM

## 2020-09-21 DIAGNOSIS — E06.3 HYPOTHYROIDISM DUE TO HASHIMOTO'S THYROIDITIS: Primary | ICD-10-CM

## 2020-09-21 RX ORDER — LEVOTHYROXINE AND LIOTHYRONINE 57; 13.5 UG/1; UG/1
90 TABLET ORAL DAILY
Refills: 0 | Status: CANCELLED | OUTPATIENT
Start: 2020-09-21

## 2020-09-21 NOTE — TELEPHONE ENCOUNTER
From: Porfirio Beard  To: Polo Lopez MD  Sent: 9/21/2020 10:45 AM CDT  Subject: Prescription Question    went to refill my [facility] thyroid 65 mg and out of stock. They do not expect in until October 9th.  The pharmacist said to contact you as I will ne

## 2020-09-21 NOTE — TELEPHONE ENCOUNTER
Order entered for [facility] thyroid- patient was on 90 mg in the past   1 grain= 65 mg -  unfortunately [place] thyroid comes in 60 mg - the next dose up is 90. Would patient like to try 60 mg or 90?   I would recommend that she take synthroid or levothyroxine

## 2020-09-21 NOTE — TELEPHONE ENCOUNTER
[facility]. Also, NP thyroid (all doses) is also recalled. Synthroid and Levothyroxine not being recalled.       Pt called-  Pt wants more natural alternative,  Pt had been on [place] i

## 2020-09-22 RX ORDER — LEVOTHYROXINE AND LIOTHYRONINE 38; 9 UG/1; UG/1
60 TABLET ORAL DAILY
Qty: 30 TABLET | Refills: 1 | Status: SHIPPED | OUTPATIENT
Start: 2020-09-22 | End: 2020-11-27

## 2020-09-22 NOTE — TELEPHONE ENCOUNTER
Pt contacted-  Pt would like to go to [place] 60mg daily. Pt is due for px in Nov- appt scheduled. Pt will also need order for full fasting lab panel in addition to thyroid. Pt may need to use outside lab facility.     Future Appointments   Date Time Pr

## 2020-11-25 ENCOUNTER — OFFICE VISIT (OUTPATIENT)
Dept: FAMILY MEDICINE CLINIC | Facility: CLINIC | Age: 60
End: 2020-11-25
Payer: COMMERCIAL

## 2020-11-25 ENCOUNTER — APPOINTMENT (OUTPATIENT)
Dept: LAB | Age: 60
End: 2020-11-25
Attending: FAMILY MEDICINE
Payer: COMMERCIAL

## 2020-11-25 VITALS
RESPIRATION RATE: 16 BRPM | HEIGHT: 68 IN | DIASTOLIC BLOOD PRESSURE: 78 MMHG | WEIGHT: 226.38 LBS | HEART RATE: 96 BPM | TEMPERATURE: 98 F | OXYGEN SATURATION: 96 % | SYSTOLIC BLOOD PRESSURE: 124 MMHG | BODY MASS INDEX: 34.31 KG/M2

## 2020-11-25 DIAGNOSIS — E03.8 HYPOTHYROIDISM DUE TO HASHIMOTO'S THYROIDITIS: ICD-10-CM

## 2020-11-25 DIAGNOSIS — Z78.0 POSTMENOPAUSAL: ICD-10-CM

## 2020-11-25 DIAGNOSIS — N60.12 FIBROCYSTIC BREAST CHANGES OF BOTH BREASTS: ICD-10-CM

## 2020-11-25 DIAGNOSIS — Z00.00 ANNUAL PHYSICAL EXAM: Primary | ICD-10-CM

## 2020-11-25 DIAGNOSIS — N60.11 FIBROCYSTIC BREAST CHANGES OF BOTH BREASTS: ICD-10-CM

## 2020-11-25 DIAGNOSIS — E06.3 HYPOTHYROIDISM DUE TO HASHIMOTO'S THYROIDITIS: ICD-10-CM

## 2020-11-25 PROCEDURE — 3078F DIAST BP <80 MM HG: CPT | Performed by: FAMILY MEDICINE

## 2020-11-25 PROCEDURE — 83735 ASSAY OF MAGNESIUM: CPT | Performed by: FAMILY MEDICINE

## 2020-11-25 PROCEDURE — 3008F BODY MASS INDEX DOCD: CPT | Performed by: FAMILY MEDICINE

## 2020-11-25 PROCEDURE — 3074F SYST BP LT 130 MM HG: CPT | Performed by: FAMILY MEDICINE

## 2020-11-25 PROCEDURE — 82306 VITAMIN D 25 HYDROXY: CPT | Performed by: FAMILY MEDICINE

## 2020-11-25 PROCEDURE — 80053 COMPREHEN METABOLIC PANEL: CPT | Performed by: FAMILY MEDICINE

## 2020-11-25 PROCEDURE — 85025 COMPLETE CBC W/AUTO DIFF WBC: CPT | Performed by: FAMILY MEDICINE

## 2020-11-25 PROCEDURE — 80061 LIPID PANEL: CPT | Performed by: FAMILY MEDICINE

## 2020-11-25 PROCEDURE — 99396 PREV VISIT EST AGE 40-64: CPT | Performed by: FAMILY MEDICINE

## 2020-11-25 PROCEDURE — 82607 VITAMIN B-12: CPT | Performed by: FAMILY MEDICINE

## 2020-11-25 PROCEDURE — 36415 COLL VENOUS BLD VENIPUNCTURE: CPT | Performed by: FAMILY MEDICINE

## 2020-11-25 PROCEDURE — 99212 OFFICE O/P EST SF 10 MIN: CPT | Performed by: FAMILY MEDICINE

## 2020-11-25 PROCEDURE — 84443 ASSAY THYROID STIM HORMONE: CPT | Performed by: FAMILY MEDICINE

## 2020-11-25 PROCEDURE — 84480 ASSAY TRIIODOTHYRONINE (T3): CPT | Performed by: FAMILY MEDICINE

## 2020-11-25 PROCEDURE — 84439 ASSAY OF FREE THYROXINE: CPT | Performed by: FAMILY MEDICINE

## 2020-11-25 NOTE — PROGRESS NOTES
CC: Annual Physical Exam    HPI:   Christie English is a 64year old female who presents for a complete physical exam.     Hot flashes , 1 year post menopause  Concern re weight gain postmenopausal.  Patient wanting to consider hormone replacement discusse Million/uL    HEMOGLOBIN 13.5 11.7 - 15.5 g/dL    HEMATOCRIT 39.8 35.0 - 45.0 %    MCV 89.0 80.0 - 100.0 fL    MCH 30.2 27.0 - 33.0 pg    MCHC 33.9 32.0 - 36.0 g/dL    RDW 12.1 11.0 - 15.0 %    PLATELET COUNT 622 922 - 400 Thousand/uL    MPV 10.8 7.5 - 12. not taking: Reported on 11/25/2020) 90 tablet 1        Bee Venom               ANAPHYLAXIS    Comment:Bee stings   Past Medical History:   Diagnosis Date   • Fibrocystic breast changes of both breasts 9/30/2011      No past surgical history on file.    No f response, history of asthma, sneezing, hives, eczema or rhinitis.     EXAM:   /78   Pulse 96   Temp 98.3 °F (36.8 °C) (Temporal)   Resp 16   Ht 68\"   Wt 226 lb 6.4 oz (102.7 kg)   LMP 09/09/2019   SpO2 96%   BMI 34.42 kg/m²  Estimated body mass index declined. Covid precautions and CDC guidelines discussed with patient. 2. Hypothyroidism due to Hashimoto's thyroiditis  Patient due for laboratory studies continue on [place] Thyroid    3.  Fibrocystic breast changes of both breasts  Encouraged yearly m

## 2020-11-25 NOTE — PATIENT INSTRUCTIONS
rec fasting labs-     Encourage healthy diet and exercise    Mammogram yearly    rec colon screening- cologuard    rec flu vaccine

## 2020-11-27 ENCOUNTER — TELEPHONE (OUTPATIENT)
Dept: FAMILY MEDICINE CLINIC | Facility: CLINIC | Age: 60
End: 2020-11-27

## 2020-11-27 DIAGNOSIS — Z00.00 ANNUAL PHYSICAL EXAM: ICD-10-CM

## 2020-11-27 DIAGNOSIS — E06.3 HYPOTHYROIDISM DUE TO HASHIMOTO'S THYROIDITIS: Primary | ICD-10-CM

## 2020-11-27 DIAGNOSIS — N28.9 RENAL INSUFFICIENCY: Primary | ICD-10-CM

## 2020-11-27 DIAGNOSIS — E03.8 HYPOTHYROIDISM DUE TO HASHIMOTO'S THYROIDITIS: Primary | ICD-10-CM

## 2020-11-27 NOTE — TELEPHONE ENCOUNTER
Pt called back. Pt changed from [facility] thyroid 65 mg to [facility] in September due to pharmacy backorder. Pt concerned about the elevated TSH, states she has never been that high. Pt wonders if she should have her current dose adjusted?

## 2020-11-27 NOTE — TELEPHONE ENCOUNTER
----- Message from Sandra Huertas MD sent at 11/27/2020  9:08 AM CST -----  Laboratory test results reviewed. Patient with Chemistry profile showing slightly decreased renal function.   Patient advised to increase water intake avoid use of anti-inflamm

## 2020-11-27 NOTE — TELEPHONE ENCOUNTER
No change in dosage recommended at this time and T4 and t3 normal. TSH- can be affected by other stressors, illness, allergies.     Recheck 6 months

## 2020-11-30 RX ORDER — LEVOTHYROXINE AND LIOTHYRONINE 38; 9 UG/1; UG/1
60 TABLET ORAL DAILY
Qty: 90 TABLET | Refills: 1 | Status: SHIPPED | OUTPATIENT
Start: 2020-11-30 | End: 2020-12-28

## 2020-11-30 NOTE — TELEPHONE ENCOUNTER
Pt called, states that she's still waiting for a refill on her thyroid 60mg 90 day supply to [address] in [place]. Pt states that she's now completely out.

## 2020-11-30 NOTE — TELEPHONE ENCOUNTER
Future appt:    Last Appointment with provider:   11/25/2020  Last appointment at [facility]:  11/25/2020-px    thyroid 60 MG Oral Tab-Sig:   Take 1 tablet (60 mg total) by mouth daily. Last refilled on 09/22/2020-#30 with 1 refill.   Cholesterol, Tota

## 2020-12-01 NOTE — TELEPHONE ENCOUNTER
Dr. Silvano Tan out of office for evening, patient completely out of medication. Chart reviewed, recent office visit with no changes to thyroid medication. Refill sent.

## 2020-12-09 ENCOUNTER — TELEPHONE (OUTPATIENT)
Dept: FAMILY MEDICINE CLINIC | Facility: CLINIC | Age: 60
End: 2020-12-09

## 2020-12-26 ENCOUNTER — PATIENT MESSAGE (OUTPATIENT)
Dept: FAMILY MEDICINE CLINIC | Facility: CLINIC | Age: 60
End: 2020-12-26

## 2020-12-27 ENCOUNTER — PATIENT MESSAGE (OUTPATIENT)
Dept: FAMILY MEDICINE CLINIC | Facility: CLINIC | Age: 60
End: 2020-12-27

## 2020-12-28 RX ORDER — LEVOTHYROXINE AND LIOTHYRONINE 57; 13.5 UG/1; UG/1
90 TABLET ORAL DAILY
Qty: 90 TABLET | Refills: 0 | Status: SHIPPED | OUTPATIENT
Start: 2020-12-28 | End: 2021-02-28

## 2020-12-28 NOTE — TELEPHONE ENCOUNTER
Pt confirmed that she is taking [place] thyroid 60mg daily. Pt agreed to schedule appt for f/u- scheduled tomorrow afternoon.     Pt asked if I could check with pharmacy to see if [facility] thyroid would be available  Pt informed that is would be unlikely that med

## 2020-12-28 NOTE — TELEPHONE ENCOUNTER
From: Lanette Curry  To: Bob Rodríguez MD  Sent: 12/26/2020 9:17 AM CST  Subject: Other    I had some blood in my urine this morning and have some mild left back achyness.   I know my kidney # was slightly high on blood test. No pain on urinating an

## 2020-12-28 NOTE — TELEPHONE ENCOUNTER
See my chart messages below dated 12/26/20 and 12/27/20. Spoke with pt. Pt states that she started her cycle on 12/26. Pt convinced that her high TSH level has caused her abnormal vaginal bleeding.   Pt states she was concerned with her last lab report -

## 2020-12-28 NOTE — TELEPHONE ENCOUNTER
From: Reji Mcfadden  To: Ariela Wallace MD  Sent: 12/27/2020 12:49 PM CST  Subject: Prescription Question    Ended up coming out of menopause, I believe due to High thyroid numbers.  Would like to up my thyroid medication  to get back to in the 2.00'

## 2020-12-28 NOTE — TELEPHONE ENCOUNTER
Sabina Gaines MD 19 hours ago (12:49 PM)        Ended up coming out of menopause, I believe due to High thyroid numbers. Would like to up my thyroid medication  to get back to in the 2.00's which is normal for me.  Having a few othe

## 2020-12-28 NOTE — TELEPHONE ENCOUNTER
Pt now has 2 doses of thyroid suppliment on her chart- please clarify what she is actually taking-- if on thyroid 60- we can increase dose to pharmacy of choice and recheck level 2 months        Patient MUST have eval of bleeding-- appt here or with GYNE.

## 2020-12-29 ENCOUNTER — OFFICE VISIT (OUTPATIENT)
Dept: FAMILY MEDICINE CLINIC | Facility: CLINIC | Age: 60
End: 2020-12-29
Payer: COMMERCIAL

## 2020-12-29 VITALS
BODY MASS INDEX: 34.86 KG/M2 | HEART RATE: 92 BPM | DIASTOLIC BLOOD PRESSURE: 90 MMHG | SYSTOLIC BLOOD PRESSURE: 166 MMHG | TEMPERATURE: 99 F | WEIGHT: 230 LBS | RESPIRATION RATE: 20 BRPM | HEIGHT: 68 IN

## 2020-12-29 DIAGNOSIS — R94.6 ABNORMAL THYROID FUNCTION TEST: Primary | ICD-10-CM

## 2020-12-29 DIAGNOSIS — N93.8 DYSFUNCTIONAL UTERINE BLEEDING: ICD-10-CM

## 2020-12-29 PROCEDURE — 99214 OFFICE O/P EST MOD 30 MIN: CPT | Performed by: FAMILY MEDICINE

## 2020-12-29 PROCEDURE — 3080F DIAST BP >= 90 MM HG: CPT | Performed by: FAMILY MEDICINE

## 2020-12-29 PROCEDURE — 3008F BODY MASS INDEX DOCD: CPT | Performed by: FAMILY MEDICINE

## 2020-12-29 PROCEDURE — 3077F SYST BP >= 140 MM HG: CPT | Performed by: FAMILY MEDICINE

## 2020-12-29 NOTE — PROGRESS NOTES
Shabana Britton is a 64year old female. Patient presents with:  Vaginal Problem: started on 12/26/20  Thyroid Problem      HPI:   Patient presents for evaluation of uterine bleeding.     Last menses 9/2019  Patient with abdominal cramping and breast ten 90 tablet 0      Past Medical History:   Diagnosis Date   • Fibrocystic breast changes of both breasts 9/30/2011      History reviewed. No pertinent surgical history. History reviewed. No pertinent family history.    Social History:  Smoking: Denies     Re 5.30 x10(6)uL    HGB 13.7 12.0 - 16.0 g/dL    HCT 41.1 35.0 - 48.0 %    .0 150.0 - 450.0 10(3)uL    MCV 89.5 80.0 - 100.0 fL    MCH 29.8 26.0 - 34.0 pg    MCHC 33.3 31.0 - 37.0 g/dL    RDW 12.5 11.0 - 15.0 %    RDW-SD 41.1 35.1 - 46.3 fL    Neutroph patient is not yet made the change discussed with her adjustment of dose patient declines endocrine referral.  - ASSAY, THYROID STIM HORMONE  - FREE T4 (FREE THYROXINE)    2. Dysfunctional uterine bleeding  Recommend pelvic ultrasound and consideration of

## 2021-01-05 ENCOUNTER — TELEPHONE (OUTPATIENT)
Dept: FAMILY MEDICINE CLINIC | Facility: CLINIC | Age: 61
End: 2021-01-05

## 2021-01-05 NOTE — TELEPHONE ENCOUNTER
Pt informed. Pt states she hasn't had any further vaginal bleeding. Pt denies any pelvic pain. Pt states she will monitor and call back should gyne referral be needed.

## 2021-01-05 NOTE — TELEPHONE ENCOUNTER
Dr. Mack Brown's patient–please notify patient that her pelvic ultrasound shows a fibroid on her uterus which is a benign growth–her left ovary is normal–her right ovary is not well seen on the ultrasound.   Would recommend follow-up with gynecology if u

## 2021-01-12 ENCOUNTER — TELEPHONE (OUTPATIENT)
Dept: FAMILY MEDICINE CLINIC | Facility: CLINIC | Age: 61
End: 2021-01-12

## 2021-01-12 NOTE — TELEPHONE ENCOUNTER
[facility] for review of records But pt is still recommended to see gynecology to evaluate and manage her issue at this time'

## 2021-01-12 NOTE — TELEPHONE ENCOUNTER
Pelvic ultrasound results reviewed. Endometrial stripe measures 9 mm. Patient is status post uterine ablation. This is a fairly thick lining for someone post ablation.   In view of patient's bleeding more than a year after her last menstrual cycle I do r

## 2021-01-12 NOTE — TELEPHONE ENCOUNTER
See telephone encounter dated 1/5/21  Pt had recent pelvic ultrasound 12/31/20 at [facility]. Pt was recommended to see gyne. Spoke with pt. Pt states she was seen by gyne in [place] - Dr. Andre Shipman approx.  5 years ago  Pt states she had endometrial biopsy done

## 2021-01-13 ENCOUNTER — TELEPHONE (OUTPATIENT)
Dept: FAMILY MEDICINE CLINIC | Facility: CLINIC | Age: 61
End: 2021-01-13

## 2021-01-13 DIAGNOSIS — N93.8 DYSFUNCTIONAL UTERINE BLEEDING: Primary | ICD-10-CM

## 2021-01-13 NOTE — TELEPHONE ENCOUNTER
Pt states that she made an appt at [facility] with Dr Ramona Bain, please fax referral. Pt didn't had fax number.

## 2021-01-13 NOTE — TELEPHONE ENCOUNTER
Referral order, copy of ultrasound report, and lab flow sheet (from most recent lab panel 11/25/20) faxed to Dr. Sharonda Cates. Pt was informed, pt states her appt is schduled on 1/29/21.

## 2021-01-20 ENCOUNTER — TELEPHONE (OUTPATIENT)
Dept: FAMILY MEDICINE CLINIC | Facility: CLINIC | Age: 61
End: 2021-01-20

## 2021-01-20 NOTE — TELEPHONE ENCOUNTER
Notice for Expiring Prior Authorization received for [place] Thyroid. Pt currently on [place] thyroid supplement 90 mg per day. Awaiting fax form.

## 2021-02-27 DIAGNOSIS — E03.8 HYPOTHYROIDISM DUE TO HASHIMOTO'S THYROIDITIS: Primary | ICD-10-CM

## 2021-02-27 DIAGNOSIS — E06.3 HYPOTHYROIDISM DUE TO HASHIMOTO'S THYROIDITIS: Primary | ICD-10-CM

## 2021-02-27 LAB
T4, FREE: 0.9 NG/DL (ref 0.8–1.8)
TSH: 3.04 MIU/L (ref 0.4–4.5)

## 2021-03-01 RX ORDER — LEVOTHYROXINE AND LIOTHYRONINE 57; 13.5 UG/1; UG/1
90 TABLET ORAL DAILY
Qty: 90 TABLET | Refills: 3 | Status: SHIPPED | OUTPATIENT
Start: 2021-03-01

## 2021-03-01 NOTE — TELEPHONE ENCOUNTER
Future appt:    Last Appointment with provider:   12/29/2020  Last appointment at [facility]:  12/29/2020  Last px: 11/25/20    Thyroid 90 mg- refilled on 12/28/20 for #90    TSH/Free T4 checked on 2/26/21- pt was asked to recheck in 6 months    Gladis

## 2021-06-01 RX ORDER — EPINEPHRINE 0.3 MG/.3ML
0.3 INJECTION SUBCUTANEOUS AS NEEDED
Qty: 1 EACH | Refills: 1 | Status: SHIPPED | OUTPATIENT
Start: 2021-06-01

## 2021-06-01 NOTE — TELEPHONE ENCOUNTER
Future appt:    Last Appointment with provider:   12/29/2020  Last appointment at [facility]:  12/29/2020  Last px: 11/25/20    Epi pen refilled on 5/26/20 #1, 1 refill  Pt has bee sting allergy listed.     Cholesterol, Total (mg/dL)   Date Value   11/25/

## 2023-05-19 NOTE — TELEPHONE ENCOUNTER
See telephone note from 12/4/19. Pt sent reminder that prior authorization is needed for thyroid supplement -   Per pt  \"I have tried Levothyroxine before without good results.   [facility] or [facility] Thyroid is what we determined works best for [employer] ambulatory

## 2025-08-05 ENCOUNTER — HOSPITAL ENCOUNTER (OUTPATIENT)
Dept: GENERAL RADIOLOGY | Age: 65
Discharge: HOME | End: 2025-08-05
Attending: NURSE PRACTITIONER

## 2025-08-05 ENCOUNTER — OFFICE VISIT (OUTPATIENT)
Dept: FAMILY MEDICINE CLINIC | Facility: CLINIC | Age: 65
End: 2025-08-05

## 2025-08-05 VITALS
SYSTOLIC BLOOD PRESSURE: 142 MMHG | TEMPERATURE: 98 F | RESPIRATION RATE: 18 BRPM | BODY MASS INDEX: 34.27 KG/M2 | HEIGHT: 69 IN | HEART RATE: 80 BPM | OXYGEN SATURATION: 98 % | WEIGHT: 231.38 LBS | DIASTOLIC BLOOD PRESSURE: 84 MMHG

## 2025-08-05 DIAGNOSIS — J06.9 VIRAL URI: ICD-10-CM

## 2025-08-05 DIAGNOSIS — R05.1 ACUTE COUGH: Primary | ICD-10-CM

## 2025-08-05 DIAGNOSIS — R05.1 ACUTE COUGH: ICD-10-CM

## 2025-08-05 PROCEDURE — 99203 OFFICE O/P NEW LOW 30 MIN: CPT | Performed by: NURSE PRACTITIONER

## 2025-08-05 PROCEDURE — 3079F DIAST BP 80-89 MM HG: CPT | Performed by: NURSE PRACTITIONER

## 2025-08-05 PROCEDURE — 3077F SYST BP >= 140 MM HG: CPT | Performed by: NURSE PRACTITIONER

## 2025-08-05 PROCEDURE — 71046 X-RAY EXAM CHEST 2 VIEWS: CPT | Performed by: NURSE PRACTITIONER

## 2025-08-05 PROCEDURE — 3008F BODY MASS INDEX DOCD: CPT | Performed by: NURSE PRACTITIONER

## 2025-08-05 RX ORDER — ALBUTEROL SULFATE 90 UG/1
2 INHALANT RESPIRATORY (INHALATION) EVERY 4 HOURS PRN
Qty: 1 EACH | Refills: 0 | Status: SHIPPED | OUTPATIENT
Start: 2025-08-05

## 2025-08-05 RX ORDER — BENZONATATE 200 MG/1
200 CAPSULE ORAL 3 TIMES DAILY PRN
Qty: 20 CAPSULE | Refills: 0 | Status: SHIPPED | OUTPATIENT
Start: 2025-08-05

## (undated) NOTE — LETTER
02/02/21        [facility]  [address] [facility]      Dear Forest Martin,    [id number] [facility] records indicate that you have outstanding lab work and or testing that was ordered for you and has not yet been completed:  Orders Placed This Encounte

## (undated) NOTE — LETTER
12/04/17        Gustavo Ralph  [address] [facility]      Dear Trent Monet,    [address] records indicate that you have outstanding lab work and or testing that was ordered for you and has not yet been completed:          TSH and Free T4 [E]

## (undated) NOTE — LETTER
01/17/19        Sheng Ralph  [address] [facility]      Dear Nelly Becerra,    [address] [facility] records indicate that you have outstanding lab work and or testing that was ordered for you and has not yet been completed:  Orders Placed This Encounte

## (undated) NOTE — LETTER
10/23/20        Obed Ralph  [address] [facility]      Dear Jorge Biggs,    [address] [facility] records indicate that you have outstanding lab work and or testing that was ordered for you and has not yet been completed:  Orders Placed This Encounte